# Patient Record
Sex: MALE | Race: WHITE | NOT HISPANIC OR LATINO
[De-identification: names, ages, dates, MRNs, and addresses within clinical notes are randomized per-mention and may not be internally consistent; named-entity substitution may affect disease eponyms.]

---

## 2022-04-15 PROBLEM — Z00.00 ENCOUNTER FOR PREVENTIVE HEALTH EXAMINATION: Status: ACTIVE | Noted: 2022-04-15

## 2022-04-26 ENCOUNTER — APPOINTMENT (OUTPATIENT)
Age: 72
End: 2022-04-26
Payer: MEDICARE

## 2022-04-26 VITALS
SYSTOLIC BLOOD PRESSURE: 126 MMHG | HEIGHT: 72 IN | RESPIRATION RATE: 14 BRPM | DIASTOLIC BLOOD PRESSURE: 80 MMHG | OXYGEN SATURATION: 98 % | HEART RATE: 79 BPM | WEIGHT: 195 LBS | BODY MASS INDEX: 26.41 KG/M2

## 2022-04-26 DIAGNOSIS — Z82.49 FAMILY HISTORY OF ISCHEMIC HEART DISEASE AND OTHER DISEASES OF THE CIRCULATORY SYSTEM: ICD-10-CM

## 2022-04-26 DIAGNOSIS — Z86.59 PERSONAL HISTORY OF OTHER MENTAL AND BEHAVIORAL DISORDERS: ICD-10-CM

## 2022-04-26 DIAGNOSIS — Z87.891 PERSONAL HISTORY OF NICOTINE DEPENDENCE: ICD-10-CM

## 2022-04-26 PROCEDURE — 99203 OFFICE O/P NEW LOW 30 MIN: CPT

## 2022-04-26 NOTE — HISTORY OF PRESENT ILLNESS
[Initial Evaluation] : an initial evaluation of [Witnessed Apnea During Sleep] : witnessed apnea during sleep [Witnessed Gasping During Sleep] : witnessed gasping during sleep [Snoring] : snoring [Unrefreshing Sleep] : unrefreshing sleep [Sleepy When Sedentary] : sleepy when sedentary [Shortness of Breath] : no shortness of breath [None] : The patient is not currently being treated for this problem

## 2022-05-16 ENCOUNTER — OUTPATIENT (OUTPATIENT)
Dept: OUTPATIENT SERVICES | Facility: HOSPITAL | Age: 72
LOS: 1 days | Discharge: HOME | End: 2022-05-16
Payer: MEDICARE

## 2022-05-16 PROCEDURE — 95806 SLEEP STUDY UNATT&RESP EFFT: CPT | Mod: 26

## 2022-05-17 DIAGNOSIS — G47.33 OBSTRUCTIVE SLEEP APNEA (ADULT) (PEDIATRIC): ICD-10-CM

## 2022-06-01 ENCOUNTER — APPOINTMENT (OUTPATIENT)
Age: 72
End: 2022-06-01
Payer: MEDICARE

## 2022-06-01 PROCEDURE — 99442: CPT | Mod: 95

## 2022-06-01 NOTE — HISTORY OF PRESENT ILLNESS
[Home] : at home, [unfilled] , at the time of the visit. [Medical Office: ([facility])___] : at the medical office located in

## 2022-06-21 ENCOUNTER — APPOINTMENT (OUTPATIENT)
Dept: NEUROLOGY | Facility: CLINIC | Age: 72
End: 2022-06-21
Payer: MEDICARE

## 2022-06-21 VITALS
SYSTOLIC BLOOD PRESSURE: 160 MMHG | DIASTOLIC BLOOD PRESSURE: 86 MMHG | HEIGHT: 72 IN | HEART RATE: 64 BPM | BODY MASS INDEX: 26.41 KG/M2 | WEIGHT: 195 LBS

## 2022-06-21 DIAGNOSIS — G50.1 ATYPICAL FACIAL PAIN: ICD-10-CM

## 2022-06-21 DIAGNOSIS — M54.12 RADICULOPATHY, CERVICAL REGION: ICD-10-CM

## 2022-06-21 DIAGNOSIS — G62.9 POLYNEUROPATHY, UNSPECIFIED: ICD-10-CM

## 2022-06-21 PROCEDURE — 99204 OFFICE O/P NEW MOD 45 MIN: CPT

## 2022-06-21 RX ORDER — GABAPENTIN 300 MG/1
300 CAPSULE ORAL 3 TIMES DAILY
Qty: 90 | Refills: 1 | Status: ACTIVE | COMMUNITY
Start: 2022-06-21 | End: 1900-01-01

## 2022-06-21 NOTE — HISTORY OF PRESENT ILLNESS
[FreeTextEntry1] : Patient is a 71 year old man who presents for initial neurology consultation with chief complaints of chronic neuropathy at an onset of a year or so. The patient reports having burning and tingling of bilateral lower extremities that travels up to the knees. He denies being diabetic. Patient admits to having an unsteady gait. He is interested in getting a cane. Of note, patient complains of neck pain. The neck pain radiates down upper extremities with associated numbness. \par \par In addition, patient reports having a head injury about 10 years ago. He reports having pain in the nasal cavity that travels behind the eye that starts from the occipital region.. He describes it as a shooting and dull like sensation. He states he has undergone cranial unwinding which offers relief however, his symptoms eventually return.

## 2022-06-21 NOTE — ASSESSMENT
[FreeTextEntry1] : Atypical facial pain- I would like to send him for a MRI of the cervical spine since he does have pain shooting down his arms. ,and he had MRI of the brain already\par neuropathy- I am sending him for EMG/NCS of both upepr and lower extremities.\par CHRISTINA-follow up with his pulmonologist for CPAP therapy

## 2022-06-21 NOTE — PHYSICAL EXAM

## 2022-06-28 ENCOUNTER — APPOINTMENT (OUTPATIENT)
Dept: NEUROLOGY | Facility: CLINIC | Age: 72
End: 2022-06-28

## 2022-06-28 PROCEDURE — 95913 NRV CNDJ TEST 13/> STUDIES: CPT

## 2022-06-28 PROCEDURE — 95886 MUSC TEST DONE W/N TEST COMP: CPT

## 2022-08-02 ENCOUNTER — APPOINTMENT (OUTPATIENT)
Dept: NEUROLOGY | Facility: CLINIC | Age: 72
End: 2022-08-02

## 2022-08-02 VITALS — BODY MASS INDEX: 26.41 KG/M2 | HEIGHT: 72 IN | WEIGHT: 195 LBS

## 2022-08-02 VITALS — SYSTOLIC BLOOD PRESSURE: 140 MMHG | HEART RATE: 65 BPM | DIASTOLIC BLOOD PRESSURE: 68 MMHG

## 2022-08-02 DIAGNOSIS — G62.9 POLYNEUROPATHY, UNSPECIFIED: ICD-10-CM

## 2022-08-02 DIAGNOSIS — G50.1 ATYPICAL FACIAL PAIN: ICD-10-CM

## 2022-08-02 DIAGNOSIS — M54.12 RADICULOPATHY, CERVICAL REGION: ICD-10-CM

## 2022-08-02 PROCEDURE — 99214 OFFICE O/P EST MOD 30 MIN: CPT

## 2022-08-02 RX ORDER — GABAPENTIN 600 MG/1
600 TABLET, COATED ORAL
Qty: 270 | Refills: 0 | Status: ACTIVE | COMMUNITY
Start: 2022-08-02 | End: 1900-01-01

## 2022-08-02 NOTE — ASSESSMENT
[FreeTextEntry1] : Peripheral neuropathy- confirmed by current EMG/NCS.   had previous extensive blood work but no etiology found.  Gabapentin works well without any side effects.   I told them about getting a nerve biopsy in the city,  but is not something they are interested in at this time. Patient had about 60% relief just on 900 mg of gabapentin, and would like to increase the dose.\par \par Atypical facial pain- MRI in the past did not reveal any significant intracranial pathology, and symptom is improved with the use of gabapentin.  I recommend continuing\par \par   Cervical radiculopathy- induced by exercise in the gym.  I would like to send him for MRI of the cervical spine without gadolinium and a trial of physical therapy.\par \par Total clinician time spent  is  35 minutes including preparing to see the patient, obtaining and/or reviewing and confirming history, performing a medically necessary and appropriate examination, counseling and educating the patient and/or family, documenting clinical information in the HER and communicating and/or referring to other healthcare professionals.\par \par

## 2022-08-02 NOTE — HISTORY OF PRESENT ILLNESS
[FreeTextEntry1] : Elizabeth Laurent returns to the office for follow up and patient’s prior history and physical have been reviewed and he reports no change since last visit. Since last visit, patient states trialinig Gabapentin 300 mg TID with no side effects. He finds the medication does offer relief. He states 60-70% improvement compared to before. He is interested on increasing the dosage. He states he is most symptomatic in the morning. 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 Of note, patient has undergone blood work prior with no abnormal finding. In addition, patient continues to complain of cervical radiculopathy. He notes having occipital pain that travels around the nasal cavity that travels behind the eyes. He denies trialing physical therapy for the neck.

## 2022-08-18 ENCOUNTER — APPOINTMENT (OUTPATIENT)
Age: 72
End: 2022-08-18

## 2022-09-01 ENCOUNTER — APPOINTMENT (OUTPATIENT)
Age: 72
End: 2022-09-01

## 2022-09-01 VITALS
BODY MASS INDEX: 26.41 KG/M2 | WEIGHT: 195 LBS | SYSTOLIC BLOOD PRESSURE: 140 MMHG | HEART RATE: 79 BPM | HEIGHT: 72 IN | DIASTOLIC BLOOD PRESSURE: 70 MMHG | RESPIRATION RATE: 14 BRPM | OXYGEN SATURATION: 97 %

## 2022-09-01 PROCEDURE — 99213 OFFICE O/P EST LOW 20 MIN: CPT

## 2022-11-01 ENCOUNTER — APPOINTMENT (OUTPATIENT)
Dept: NEUROLOGY | Facility: CLINIC | Age: 72
End: 2022-11-01

## 2022-12-05 ENCOUNTER — APPOINTMENT (OUTPATIENT)
Age: 72
End: 2022-12-05

## 2022-12-05 VITALS
RESPIRATION RATE: 14 BRPM | WEIGHT: 195 LBS | BODY MASS INDEX: 26.41 KG/M2 | HEART RATE: 71 BPM | DIASTOLIC BLOOD PRESSURE: 80 MMHG | SYSTOLIC BLOOD PRESSURE: 124 MMHG | OXYGEN SATURATION: 97 % | HEIGHT: 72 IN

## 2022-12-05 PROCEDURE — 99213 OFFICE O/P EST LOW 20 MIN: CPT

## 2023-08-08 ENCOUNTER — APPOINTMENT (OUTPATIENT)
Dept: PULMONOLOGY | Facility: CLINIC | Age: 73
End: 2023-08-08

## 2023-08-08 ENCOUNTER — APPOINTMENT (OUTPATIENT)
Dept: PULMONOLOGY | Facility: CLINIC | Age: 73
End: 2023-08-08
Payer: MEDICARE

## 2023-08-08 VITALS
OXYGEN SATURATION: 96 % | BODY MASS INDEX: 27.09 KG/M2 | WEIGHT: 200 LBS | HEART RATE: 60 BPM | HEIGHT: 72 IN | DIASTOLIC BLOOD PRESSURE: 80 MMHG | SYSTOLIC BLOOD PRESSURE: 148 MMHG

## 2023-08-08 DIAGNOSIS — G47.33 OBSTRUCTIVE SLEEP APNEA (ADULT) (PEDIATRIC): ICD-10-CM

## 2023-08-08 PROCEDURE — 99213 OFFICE O/P EST LOW 20 MIN: CPT

## 2023-08-08 NOTE — HISTORY OF PRESENT ILLNESS
[TextBox_4] : Patient said that he has been using the machine every day good at night and feels fresh and energetic during the day he denies any cough wheezing shortness of breath, he was counseled about the humidifier and how to adjust it

## 2023-08-08 NOTE — PLAN
[TextEntry] : patient was counseled to use the machine every night at least 4 hrs  also counseled for weight loss and exercise We contacted vendor for new compliance report to review